# Patient Record
Sex: FEMALE | Race: OTHER | Employment: STUDENT | URBAN - NONMETROPOLITAN AREA
[De-identification: names, ages, dates, MRNs, and addresses within clinical notes are randomized per-mention and may not be internally consistent; named-entity substitution may affect disease eponyms.]

---
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
## 2025-01-27 NOTE — PROGRESS NOTES
[facility] PROFESSIONAL SERVS  [facility] INTERNAL MEDICINE  [address] [facility] [place] [id number] [phone]       Accompanied by:  Self    Maria Ccharley Sanford is a 19 y.o. female , initial Peds Endo visit, referred by NP Aruna Ghosh ([facility]) for evalutation of poorly controlled insulin dependent diabetes.  From chart review, etiology of diabetes unlcear.  Last Hgb A1c in PCM office was 12.7% in DEC 24.    Diagnosis:  2014 at age 9 -  in  health clinic in [place] - presumed Type 2 at that time -  started following with Peds Endo at [facility] around age 12-13 and diagnosis felt to be possiblyT1 DM.  She does not think she has ever had Diabetes antibodies tested and no evidence in chart    DKA : No history    Hospitalizations: AUG 2022 -  poorly controlled blood glucose levels ( not in DKA) and suicidal ideation    Glucagon use: Never had to use    Care Team:    PCM:  [facility]  Sees a counselor at [facility] at school -  once a week    Hemoglobin A1C POC   Date Value Ref Range Status   01/28/2025 11.6 (H) 4.3 - 5.7 % Final     Comment:     Performed at [facility] [address] [place] [address]        Omnipod pump  _ Omnipod 5 pump  since OCT 24    Dexcom Continuous Glucose Monitor  Dexcom G7 -  SEP 24    INSULIN REGIMEN    Basal rate set as 3 units/hr    I:C:  1:3   at all times    ISF - 50    Target - 110    Has Lantus as back up -  states her dose is 36 units daily if she needs to take it.    Has Baqsimi at home    Also takes Trulicity 0.75 mg once weekly    Boluses;  current practice is to bolus 10 units about 10-20 prior to eating and then counts carbs after eating and gives calculated dose nimus the 10 she took prior to eating.      Changes Pod every 3 days she thinks - but sometimes runs out    Has back-up Lantus/Novolog pens or syringes/vials in case of pump failure (Lantus dose 36 units)  Written

## 2025-01-28 ENCOUNTER — OFFICE VISIT (OUTPATIENT)
Age: 20
End: 2025-01-28
Payer: COMMERCIAL

## 2025-01-28 VITALS
BODY MASS INDEX: 41.18 KG/M2 | RESPIRATION RATE: 16 BRPM | WEIGHT: 232.4 LBS | HEART RATE: 90 BPM | SYSTOLIC BLOOD PRESSURE: 126 MMHG | OXYGEN SATURATION: 98 % | DIASTOLIC BLOOD PRESSURE: 92 MMHG | HEIGHT: 63 IN | TEMPERATURE: 97.8 F

## 2025-01-28 DIAGNOSIS — E11.65 TYPE 2 DIABETES MELLITUS WITH HYPERGLYCEMIA, WITH LONG-TERM CURRENT USE OF INSULIN (HCC): Primary | ICD-10-CM

## 2025-01-28 DIAGNOSIS — E66.01 SEVERE OBESITY DUE TO EXCESS CALORIES WITH SERIOUS COMORBIDITY AND BODY MASS INDEX (BMI) 120% OF 95TH PERCENTILE TO LESS THAN 140% OF 95TH PERCENTILE FOR AGE IN PEDIATRIC PATIENT: ICD-10-CM

## 2025-01-28 DIAGNOSIS — Z68.55 SEVERE OBESITY DUE TO EXCESS CALORIES WITH SERIOUS COMORBIDITY AND BODY MASS INDEX (BMI) 120% OF 95TH PERCENTILE TO LESS THAN 140% OF 95TH PERCENTILE FOR AGE IN PEDIATRIC PATIENT: ICD-10-CM

## 2025-01-28 DIAGNOSIS — Z79.4 TYPE 2 DIABETES MELLITUS WITH HYPERGLYCEMIA, WITH LONG-TERM CURRENT USE OF INSULIN (HCC): Primary | ICD-10-CM

## 2025-01-28 LAB — HBA1C MFR BLD: 11.6 % (ref 4.3–5.7)

## 2025-01-28 PROCEDURE — 1036F TOBACCO NON-USER: CPT | Performed by: HEALTH CARE PROVIDER

## 2025-01-28 PROCEDURE — 99204 OFFICE O/P NEW MOD 45 MIN: CPT | Performed by: HEALTH CARE PROVIDER

## 2025-01-28 PROCEDURE — G8427 DOCREV CUR MEDS BY ELIG CLIN: HCPCS | Performed by: HEALTH CARE PROVIDER

## 2025-01-28 PROCEDURE — 83036 HEMOGLOBIN GLYCOSYLATED A1C: CPT | Performed by: HEALTH CARE PROVIDER

## 2025-01-28 PROCEDURE — 2022F DILAT RTA XM EVC RTNOPTHY: CPT | Performed by: HEALTH CARE PROVIDER

## 2025-01-28 PROCEDURE — G8417 CALC BMI ABV UP PARAM F/U: HCPCS | Performed by: HEALTH CARE PROVIDER

## 2025-01-28 PROCEDURE — G2211 COMPLEX E/M VISIT ADD ON: HCPCS | Performed by: HEALTH CARE PROVIDER

## 2025-01-28 PROCEDURE — 3046F HEMOGLOBIN A1C LEVEL >9.0%: CPT | Performed by: HEALTH CARE PROVIDER

## 2025-01-28 RX ORDER — INSULIN PMP CART,AUT,G6/7,CNTR
EACH SUBCUTANEOUS
Qty: 45 EACH | Refills: 3 | Status: SHIPPED | OUTPATIENT
Start: 2025-01-28

## 2025-01-28 RX ORDER — DULAGLUTIDE 0.75 MG/.5ML
0.75 INJECTION, SOLUTION SUBCUTANEOUS WEEKLY
COMMUNITY
Start: 2025-01-07

## 2025-01-28 RX ORDER — ACYCLOVIR 400 MG/1
TABLET ORAL
COMMUNITY
Start: 2025-01-14

## 2025-01-28 RX ORDER — INSULIN ASPART 100 [IU]/ML
INJECTION, SOLUTION INTRAVENOUS; SUBCUTANEOUS
COMMUNITY

## 2025-01-28 RX ORDER — INSULIN GLARGINE 100 [IU]/ML
36 INJECTION, SOLUTION SUBCUTANEOUS PRN
COMMUNITY

## 2025-01-28 RX ORDER — INSULIN PMP CART,AUT,G6/7,CNTR
EACH SUBCUTANEOUS
COMMUNITY
Start: 2025-01-14 | End: 2025-01-28

## 2025-01-28 NOTE — PATIENT INSTRUCTIONS
BLOOD GLUCOSE MANAGEMENT     Recommendations:     Check blood glucose at least 4 times per day-before every meal and before bed  If using continuous glucose monitor (Dexcom, Alan), should be on 85-90% of the time. Set up Dexcom Share on Clarity or share on [employer] so our office can review when you request.  Keep blood glucose log/diary so that you can review the patterns/trends to make insulin adjustments.   It is recommended that you call in for assistance with insulin adjustment if blood glucose is persistently < 70 or > 300 or frequent ketones.  Check blood glucose before exercise, during, and after exercise or when Maria C Sanford feels \"low\".  Check for ketones when blood glucose is greater than 300 mg/dL. If ketones are positive, administer additional insulin as directed-see dosing attached.  Always bring your log book, school log and all meters with you to the next appointment.  Remember to rotate your injections sites.    For Blood Glucose Review:    These will be reviewed Monday-Thursday 9-4p. Please allow 24 hours for records to be reviewed. There is no one available to review blood glucose logs on weekends:  -Email blood glucose logs or make sure to upload CGM data and email to [email or web address] .  Please include your child's full name, date of birth, and current insulin doses.    Contact information:  Please call the office at [phone] during normal business hours, Monday-Friday 8:00am - 4:00pm. A  will assist you.          [facility] PROFESSIONAL [facility] INTERNAL MEDICINE  [address] [place] [id number]  [phone]       [facility] Diabetes Education Pump Back Up Plan     Name: Maria C Sanford     : 2005    In the event of an insulin pump malfunction, please follow these instructions.     Pump Set Change Precautions: Always be aware of your Blood Glucose (BG) trends. When placing a new cannula, pick a time early in the day so you can

## 2025-02-03 ENCOUNTER — TELEPHONE (OUTPATIENT)
Dept: INTERNAL MEDICINE CLINIC | Age: 20
End: 2025-02-03

## 2025-02-03 NOTE — TELEPHONE ENCOUNTER
Attempted 1 week pump download; however, only 8 hours of data available on the download. Called [employer] representative for assistance.

## 2025-02-17 ENCOUNTER — TELEPHONE (OUTPATIENT)
Dept: INTERNAL MEDICINE CLINIC | Age: 20
End: 2025-02-17

## 2025-02-17 NOTE — TELEPHONE ENCOUNTER
Patient did not show for appointment.  Called patient. No answer and unable to leave message due to mailbox being full.

## 2025-02-18 ENCOUNTER — HOSPITAL ENCOUNTER (OUTPATIENT)
Age: 20
Discharge: HOME | End: 2025-02-18
Payer: COMMERCIAL

## 2025-02-18 DIAGNOSIS — Z79.4 TYPE 2 DIABETES MELLITUS WITH HYPERGLYCEMIA, WITH LONG-TERM CURRENT USE OF INSULIN (HCC): ICD-10-CM

## 2025-02-18 DIAGNOSIS — E11.65 TYPE 2 DIABETES MELLITUS WITH HYPERGLYCEMIA, WITH LONG-TERM CURRENT USE OF INSULIN (HCC): ICD-10-CM

## 2025-02-18 LAB
CHOLESTEROL, FASTING: 196 MG/DL (ref 100–169)
HDLC SERPL-MCNC: 35 MG/DL
IGA SERPL-MCNC: 388 MG/DL (ref 70–400)
LDLC SERPL CALC-MCNC: 87 MG/DL
TRIGLYCERIDE, FASTING: 370 MG/DL (ref 0–199)
TSH SERPL DL<=0.005 MIU/L-ACNC: 2.26 UIU/ML (ref 0.4–4.2)

## 2025-02-18 PROCEDURE — 82784 ASSAY IGA/IGD/IGG/IGM EACH: CPT

## 2025-02-18 PROCEDURE — 36415 COLL VENOUS BLD VENIPUNCTURE: CPT

## 2025-02-18 PROCEDURE — 86800 THYROGLOBULIN ANTIBODY: CPT

## 2025-02-18 PROCEDURE — 86376 MICROSOMAL ANTIBODY EACH: CPT

## 2025-02-18 PROCEDURE — 83516 IMMUNOASSAY NONANTIBODY: CPT

## 2025-02-18 PROCEDURE — 80061 LIPID PANEL: CPT

## 2025-02-18 PROCEDURE — 86341 ISLET CELL ANTIBODY: CPT

## 2025-02-18 PROCEDURE — 84443 ASSAY THYROID STIM HORMONE: CPT

## 2025-02-19 LAB
THYROGLOBULIN ANTIBODY: < 12 IU/ML (ref 0–40)
THYROPEROXIDASE AB SERPL IA-ACNC: < 4 IU/ML (ref 0–25)
TTG IGA SER IA-ACNC: 0.7 U/ML

## 2025-02-20 LAB — GAD65 AB SER IA-ACNC: < 5 IU/ML (ref 0–5)

## 2025-02-21 LAB
PANC ISLET CELL AB TITR SER: NORMAL {TITER}
ZNT8 AB SERPL IA-ACNC: < 10 U/ML (ref 0–15)

## 2025-02-25 ENCOUNTER — TELEPHONE (OUTPATIENT)
Age: 20
End: 2025-02-25

## 2025-02-25 NOTE — TELEPHONE ENCOUNTER
----- Message from Dr. Sulma John MD sent at 2/24/2025 11:34 AM EST -----  Seeing patient in clinic tomorrow.    Will discuss labs at that time.    Screening for Celiac disease and autoimmune thyroid disease - NORMAL    All diabetes antibodies are normal making T2 DM a much more likely cause for her diabetes.    Triglycerides a bit high and HDL ( good cholesterol a pit low)  -  this will likely improve with getting blood glucose levels under better control.

## 2025-05-22 ENCOUNTER — TELEPHONE (OUTPATIENT)
Dept: INTERNAL MEDICINE CLINIC | Age: 20
End: 2025-05-22

## 2025-05-22 NOTE — TELEPHONE ENCOUNTER
Kamron John,  This patient has no showed 3 times for her first visit.  Do you want us to continue to contact her to reschedule her?  Thanks, MACKENZIE Worrell

## 2025-05-22 NOTE — TELEPHONE ENCOUNTER
I attempted to contact patient, phone number is no longer in service. I left a message with eri Arcos per HIPAA, to have patient contact the office and notified of phone hours.

## 2025-05-22 NOTE — TELEPHONE ENCOUNTER
Dr. John,   That is no problem that you did not get back with me right away.  I understand.   We will just wait to contact Maria C until you let us know to reach out to her.  Thank you for getting back with me.  Have a great day!  Anaid

## 2025-05-28 ENCOUNTER — PATIENT MESSAGE (OUTPATIENT)
Age: 20
End: 2025-05-28

## 2025-05-28 NOTE — TELEPHONE ENCOUNTER
I attempted to contact patient, phone number is no longer in service. I left a message with eri Arcos per HIPAA, to have patient contact the office and notified of phone hours.    Attempted to contact x3 with no response. [place] message sent to patient.